# Patient Record
Sex: FEMALE | Race: WHITE | NOT HISPANIC OR LATINO | URBAN - METROPOLITAN AREA
[De-identification: names, ages, dates, MRNs, and addresses within clinical notes are randomized per-mention and may not be internally consistent; named-entity substitution may affect disease eponyms.]

---

## 2017-10-31 ENCOUNTER — EMERGENCY (EMERGENCY)
Facility: HOSPITAL | Age: 37
LOS: 0 days | Discharge: HOME | End: 2017-10-31

## 2017-10-31 DIAGNOSIS — R09.82 POSTNASAL DRIP: ICD-10-CM

## 2017-10-31 DIAGNOSIS — R05 COUGH: ICD-10-CM

## 2017-10-31 DIAGNOSIS — Z79.51 LONG TERM (CURRENT) USE OF INHALED STEROIDS: ICD-10-CM

## 2017-10-31 DIAGNOSIS — Z79.899 OTHER LONG TERM (CURRENT) DRUG THERAPY: ICD-10-CM

## 2017-10-31 DIAGNOSIS — Z91.048 OTHER NONMEDICINAL SUBSTANCE ALLERGY STATUS: ICD-10-CM

## 2017-10-31 DIAGNOSIS — R56.9 UNSPECIFIED CONVULSIONS: ICD-10-CM

## 2017-10-31 DIAGNOSIS — B34.9 VIRAL INFECTION, UNSPECIFIED: ICD-10-CM

## 2019-02-07 ENCOUNTER — OUTPATIENT (OUTPATIENT)
Dept: OUTPATIENT SERVICES | Facility: HOSPITAL | Age: 39
LOS: 1 days | Discharge: HOME | End: 2019-02-07

## 2019-02-07 DIAGNOSIS — E03.9 HYPOTHYROIDISM, UNSPECIFIED: ICD-10-CM

## 2019-12-08 ENCOUNTER — EMERGENCY (EMERGENCY)
Facility: HOSPITAL | Age: 39
LOS: 0 days | Discharge: HOME | End: 2019-12-08
Admitting: EMERGENCY MEDICINE
Payer: MEDICAID

## 2019-12-08 VITALS
TEMPERATURE: 97 F | SYSTOLIC BLOOD PRESSURE: 120 MMHG | DIASTOLIC BLOOD PRESSURE: 76 MMHG | WEIGHT: 179.9 LBS | OXYGEN SATURATION: 97 % | RESPIRATION RATE: 16 BRPM | HEART RATE: 76 BPM

## 2019-12-08 DIAGNOSIS — R05 COUGH: ICD-10-CM

## 2019-12-08 PROCEDURE — 71046 X-RAY EXAM CHEST 2 VIEWS: CPT | Mod: 26

## 2019-12-08 PROCEDURE — 99283 EMERGENCY DEPT VISIT LOW MDM: CPT

## 2019-12-08 RX ADMIN — Medication 100 MILLIGRAM(S): at 22:09

## 2019-12-08 NOTE — ED PROVIDER NOTE - PROGRESS NOTE DETAILS
Pt aware that we will have official radiology read pending, and may result in change of xray read.  Pt voices understanding of use of medications, instructions for care, and reasons to return or to go to ED  Pt counseled - warning si/sxs d/w pt. Pt instructed to return to ed or f/u with PCP should sxs persist/worsen. Pt verbalizes an understanding & agreement with the plan as discussed This was a difficult patient encounter. The patient's expectations as to management were not able to be met given the guidelines by the state, hospital guidelines, and the patient's personal care guidelines. I discussed at length with the patient my concerns and offered treatment options that were in keeping with our policies and procedures. Unfortunately, this did not alleviate the patient's concerns. At all times myself and staff were respectful of the patient, attempted to solve their issues within our constraints, and treated the patient with due courtesy. Unfortunately, the patient left disappointed. I encouraged them to follow up with their PCP for further treawtment and to return to the ED if they had any other symptoms or concerns.  pt and father came up to desk multiple times demanding xray be done, do not want to wait at d/c pts father now asking for dose of cough med here, understands will take a while to get from pharmacy. at d/c pts father now asking for dose of cough med here, understands will take a while to get from pharmacy. ***of note, pt has been in ED for over 1.5 hrs and not observed/heard coughing once

## 2019-12-08 NOTE — ED PROVIDER NOTE - OBJECTIVE STATEMENT
pt with dry cough for 2 weeks. went to PMD after 3 days and given amox x1 week without improvement. taking otc cough meds without improvement. pts father brought her in for eval because her coughing is keeping him up at night. nonsmoker, no ocp use, no recent travel or sick contacts. Denies fever/chill/HA/dizziness/chest pain/palpitation/sob/abd pain/n/v/d/ black stool/bloody stool/urinary sxs

## 2019-12-08 NOTE — ED ADULT NURSE NOTE - OBJECTIVE STATEMENT
Pt report unproductive cough and unable to sleep at night due to cough. Denies nausea, vomiting, fevers, and diarrhea.

## 2019-12-08 NOTE — ED PROVIDER NOTE - NS ED ROS FT
Constitutional: no fever, chills, no recent weight loss, change in appetite or malaise  Eyes: no redness/discharge/pain/vision changes  ENT: no rhinorrhea/ear pain/sore throat  Cardiac: No chest pain, SOB or edema.  Respiratory: No respiratory distress  GI: No nausea, vomiting, diarrhea or abdominal pain.  : No dysuria, frequency, urgency or hematuria  MS: no pain to back or extremities, no loss of ROM, no weakness  Neuro: No headache or weakness. No LOC.  Skin: No skin rash.  Except as documented in the HPI, all other systems are negative.

## 2019-12-08 NOTE — ED PROVIDER NOTE - PHYSICAL EXAMINATION
CONSTITUTIONAL: Well-appearing; well-nourished; in no apparent distress.   ENT: normal nose; no rhinorrhea; normal pharynx with no tonsillar hypertrophy.   NECK: Supple; non-tender; no cervical lymphadenopathy. No JVD.   CARDIOVASCULAR: Normal S1, S2; no murmurs, rubs, or gallops.   RESPIRATORY: Normal chest excursion with respiration; breath sounds clear and equal bilaterally; no wheezes, rhonchi, or rales.  SKIN: Normal for age and race; warm; dry; good turgor; no apparent lesions or exudate.   NEURO/PSYCH: A & O x 4; grossly unremarkable. mood and manner are appropriate. Grooming and personal hygiene are appropriate. No apparent thoughts of harm to self or others.

## 2020-07-02 PROBLEM — M41.9 SCOLIOSIS, UNSPECIFIED: Chronic | Status: ACTIVE | Noted: 2019-12-08

## 2020-07-06 PROBLEM — Z00.00 ENCOUNTER FOR PREVENTIVE HEALTH EXAMINATION: Status: ACTIVE | Noted: 2020-07-06

## 2020-07-07 ENCOUNTER — APPOINTMENT (OUTPATIENT)
Dept: SURGERY | Facility: CLINIC | Age: 40
End: 2020-07-07

## 2021-06-05 ENCOUNTER — EMERGENCY (EMERGENCY)
Facility: HOSPITAL | Age: 41
LOS: 0 days | Discharge: HOME | End: 2021-06-05
Attending: EMERGENCY MEDICINE | Admitting: EMERGENCY MEDICINE
Payer: MEDICAID

## 2021-06-05 VITALS
SYSTOLIC BLOOD PRESSURE: 123 MMHG | DIASTOLIC BLOOD PRESSURE: 60 MMHG | HEART RATE: 86 BPM | TEMPERATURE: 98 F | RESPIRATION RATE: 18 BRPM | OXYGEN SATURATION: 98 %

## 2021-06-05 VITALS
SYSTOLIC BLOOD PRESSURE: 120 MMHG | HEART RATE: 106 BPM | HEIGHT: 60 IN | OXYGEN SATURATION: 95 % | DIASTOLIC BLOOD PRESSURE: 67 MMHG | RESPIRATION RATE: 18 BRPM | WEIGHT: 190.04 LBS | TEMPERATURE: 98 F

## 2021-06-05 DIAGNOSIS — Z79.899 OTHER LONG TERM (CURRENT) DRUG THERAPY: ICD-10-CM

## 2021-06-05 DIAGNOSIS — F41.9 ANXIETY DISORDER, UNSPECIFIED: ICD-10-CM

## 2021-06-05 DIAGNOSIS — Z86.69 PERSONAL HISTORY OF OTHER DISEASES OF THE NERVOUS SYSTEM AND SENSE ORGANS: ICD-10-CM

## 2021-06-05 DIAGNOSIS — K03.81 CRACKED TOOTH: ICD-10-CM

## 2021-06-05 DIAGNOSIS — K08.89 OTHER SPECIFIED DISORDERS OF TEETH AND SUPPORTING STRUCTURES: ICD-10-CM

## 2021-06-05 DIAGNOSIS — K02.9 DENTAL CARIES, UNSPECIFIED: ICD-10-CM

## 2021-06-05 DIAGNOSIS — Z87.39 PERSONAL HISTORY OF OTHER DISEASES OF THE MUSCULOSKELETAL SYSTEM AND CONNECTIVE TISSUE: ICD-10-CM

## 2021-06-05 DIAGNOSIS — J45.909 UNSPECIFIED ASTHMA, UNCOMPLICATED: ICD-10-CM

## 2021-06-05 PROCEDURE — 99283 EMERGENCY DEPT VISIT LOW MDM: CPT

## 2021-06-05 RX ORDER — ACETAMINOPHEN 500 MG
975 TABLET ORAL ONCE
Refills: 0 | Status: COMPLETED | OUTPATIENT
Start: 2021-06-05 | End: 2021-06-05

## 2021-06-05 RX ORDER — IBUPROFEN 200 MG
600 TABLET ORAL ONCE
Refills: 0 | Status: COMPLETED | OUTPATIENT
Start: 2021-06-05 | End: 2021-06-05

## 2021-06-05 RX ADMIN — Medication 975 MILLIGRAM(S): at 19:30

## 2021-06-05 RX ADMIN — Medication 600 MILLIGRAM(S): at 19:30

## 2021-06-05 NOTE — ED PROVIDER NOTE - NS ED ROS FT
CONST: No fever, chills or bodyaches  EYES: No pain, redness, drainage or visual changes.  ENT: No ear pain or discharge, nasal discharge or congestion. No sore throat. (+) right lower tooth pain  CARD: No chest pain, palpitations  RESP: No SOB, cough, hemoptysis. No hx of asthma or COPD  GI: No abdominal pain, N/V/D  : No urinary symptoms  MS: No joint pain, back pain or extremity pain/injury  SKIN: No rashes  NEURO: No headache, dizziness, paresthesias or LOC

## 2021-06-05 NOTE — ED PROVIDER NOTE - NSFOLLOWUPINSTRUCTIONS_ED_ALL_ED_FT
Toothache    WHAT YOU NEED TO KNOW:    A toothache is pain that is caused by irritation of the nerves in the center of your tooth. The irritation may be caused by several problems, such as a cavity, an infection, a cracked tooth, or gum disease. Tooth Anatomy         DISCHARGE INSTRUCTIONS:    Return to the emergency department if:     You have trouble breathing or swallowing.       You have swelling in your face or neck.     Contact your dentist if:     You have a fever and chills.       You have trouble opening or closing your mouth.       You have swelling around your tooth.       You have questions or concerns about your condition or care.    Medicines: You may need any of the following:     NSAIDs, such as ibuprofen, help decrease swelling, pain, and fever. This medicine is available with or without a doctor's order. NSAIDs can cause stomach bleeding or kidney problems in certain people. If you take blood thinner medicine, always ask if NSAIDs are safe for you. Always read the medicine label and follow directions. Do not give these medicines to children under 6 months of age without direction from your child's healthcare provider.      Acetaminophen decreases pain and fever. It is available without a doctor's order. Ask how much to take and how often to take it. Follow directions. Acetaminophen can cause liver damage if not taken correctly.      Prescription pain medicine may be given. Ask your healthcare provider how to take this medicine safely. Some prescription pain medicines contain acetaminophen. Do not take other medicines that contain acetaminophen without talking to your healthcare provider. Too much acetaminophen may cause liver damage. Prescription pain medicine may cause constipation. Ask your healthcare provider how to prevent or treat constipation.       Antibiotics help treat or prevent a bacterial infection.       Take your medicine as directed. Contact your healthcare provider if you think your medicine is not helping or if you have side effects. Tell him of her if you are allergic to any medicine. Keep a list of the medicines, vitamins, and herbs you take. Include the amounts, and when and why you take them. Bring the list or the pill bottles to follow-up visits. Carry your medicine list with you in case of an emergency.    Self-care:     Rinse your mouth with warm salt water 4 times a day or as directed.       Eat soft foods to help relieve pain caused by chewing.       Apply ice on your jaw or cheek for 15 to 20 minutes every hour or as directed. Use an ice pack, or put crushed ice in a plastic bag. Cover it with a towel before you apply it. Ice helps prevent tissue damage and decreases swelling and pain.    Help prevent a toothache:     Brush your teeth at least 2 times a day.      Use dental floss to clean between your teeth at least 1 time a day.      See your dentist regularly every 6 months for dental cleanings and oral exams.    Follow up with your dentist as directed: You may be referred to a dental surgeon. Write down your questions so you remember to ask them during your visits.        © Copyright [employer] 2019 All illustrations and images included in CareNotes are the copyrighted property of [employer] or [employer] network [employer].   Tooth Avulsion  Tooth avulsion is the loss of a tooth due to the tooth falling out or being knocked out of its place in the gum (socket). This condition must be treated at a dental clinic or an emergency department right away.    Your health care provider will evaluate whether the tooth can be placed back into its original position (replanted) if it is an adult tooth. (Baby teeth do not usually need replanting.) Your treatment will also include controlling any bleeding or pain.    The sooner the tooth is replanted, the better the chance that it can be saved. It is usually best if the tooth is replanted within one hour of avulsion. However, even if it has been longer than one hour, it is still important to visit your dentist as soon as possible to discuss your treatment options.    Follow these instructions at home:  Before going to the dentist or emergency department:    Find the tooth if it is outside of the mouth. Do not touch the bottom (roots) of the tooth.  Wash the tooth for 10 seconds under cold running water or bottled water. Do not scrub the tooth.  Gently reposition the tooth in its original socket. If the tooth cannot be repositioned, immediately place the tooth in a glass of cold milk or hold the tooth inside your mouth under your tongue or between your molars and your cheek.    After coming home from the dentist or emergency department:    Take over-the-counter and prescription medicines only as told by your health care provider.  Avoid activities that have a high risk of dental injury, such as contact sports, for two weeks or as directed by your health care provider. Wear a mouth guard while playing contact sports.  Brush the tooth with a soft toothbrush after every meal.  Eat a soft diet for two weeks or as directed by your health care provider.  Use a chlorhexidine mouth rinse twice a day or as directed by your health care provider.  Keep all follow-up visits as told by your health care provider. This is important.    Contact a health care provider if:  There is blood, fluid, or pus coming from the tooth socket.  You have pain that does not get better with medicine.  You develop a fever.  Summary  Tooth avulsion is the loss of a tooth due to the tooth falling out or being knocked out.  If the tooth was an adult tooth, your health care provider will see if it can be placed back into its original position (replanted). Baby teeth will not usually need to be replanted.  The sooner the tooth is replanted, the better the chance that it can be saved.  This information is not intended to replace advice given to you by your health care provider. Make sure you discuss any questions you have with your health care provider.

## 2021-06-05 NOTE — ED PROVIDER NOTE - NSFOLLOWUPCLINICS_GEN_ALL_ED_FT
[facility]  Dental  [address] [place] [id number]  Phone: [phone]  Fax:   Follow Up Time: 1-3 Days

## 2021-06-05 NOTE — ED PROVIDER NOTE - OBJECTIVE STATEMENT
39 y/o female with a PMH of epilepsy on keppra, asthma, and anxiety presents to the ED for evaluation of right lower tooth pain that began earlier today after eating dinner. pt reports she feels her right lower tooth cracked, but does not believes she swallowed the tooth. pt denies fever, chills, cough, sore throat, recent trauma, drooling, or facial swelling.

## 2021-06-05 NOTE — ED PROVIDER NOTE - CLINICAL SUMMARY MEDICAL DECISION MAKING FREE TEXT BOX
Pt presented to ED for dental pain after fractured tooth. Abx and pain control given. Will dc with dental f/up. Results reviewed and discussed with pt and printed for patient. Anticipatory guidance given including close outpatient followup. Strict return precautions given. Pt verbalizes understanding of and agrees with plan.

## 2021-06-05 NOTE — ED PROVIDER NOTE - ATTENDING CONTRIBUTION TO CARE
I personally evaluated the patient. I reviewed the Resident’s or Physician Assistant’s note (as assigned above), and agree with the findings and plan except as documented in my note.    Pt is a 39 y/o female with hx of seizures presents to ED for dental pain to right lower teeth after eating something and felt tooth crack. No fever, swelling. No other complaints.     Constitutional: Well developed, well nourished. NAD.  Head: Normocephalic.  Eyes: EOMI.  Dental: Diffuse caries and poor dentition. right lower molar with fracture.  Cardiovascular: Normal S1, S2. Regular rate and rhythm. No murmurs, rubs, or gallops.  Pulmonary: Normal respiratory rate and effort. Lungs clear to auscultation bilaterally. No wheezing, rales, or rhonchi.  Neuro: No focal neurological deficits.

## 2021-06-05 NOTE — ED PROVIDER NOTE - PATIENT PORTAL LINK FT
You can access the FollowMyHealth Patient Portal offered by [facility] by registering at the following website: [email or web address]. By joining [facility]’s FollowMyHealth portal, you will also be able to view your health information using other applications (apps) compatible with our system.

## 2021-06-05 NOTE — ED PROVIDER NOTE - PHYSICAL EXAMINATION
Physical Exam    Vital Signs: I have reviewed the initial vital signs.  Constitutional: well-nourished, appears stated age, no acute distress  Eyes: Conjunctiva pink, Sclera clear  ENT: Oropharynx is clear without lesions. uvula midline. no tonsillar erythema, edema, or exudates. no stridor. no pta. floor of the mouth is soft without pus. (+) poor dentition with multiple cavities right lower tooth #31 is avulsed and decaying. no visible dental abscess.   Cardiovascular: S1 and S2, regular rate, regular rhythm, well-perfused extremities, radial pulses equal and 2+ b/l.   Respiratory: unlabored respiratory effort, clear to auscultation bilaterally no wheezing, rales and rhonchi. pt is speaking full sentences. no accessory muscle use.   Musculoskeletal: supple neck, FROM of b/l upper and lower extremities.   Integumentary: warm, dry, no rash  Neurologic: awake, alert  Psychiatric: appropriate mood, appropriate affect

## 2021-08-16 NOTE — ED ADULT NURSE NOTE - PMH
Scoliosis Tranexamic Acid Counseling:  Patient advised of the small risk of bleeding problems with tranexamic acid. They were also instructed to call if they developed any nausea, vomiting or diarrhea. All of the patient's questions and concerns were addressed.

## 2021-10-08 NOTE — ED ADULT NURSE NOTE - BREATHING, MLM
Spontaneous, unlabored and symmetrical Pt states 4 weeks pregnant and having 5 days of RLQ abd pain and it feels "burning". Pt denies urinary symptoms, fever/chills, N/V/D.

## 2022-10-11 ENCOUNTER — NON-APPOINTMENT (OUTPATIENT)
Age: 42
End: 2022-10-11

## 2023-04-07 ENCOUNTER — APPOINTMENT (OUTPATIENT)
Dept: NEUROLOGY | Facility: CLINIC | Age: 43
End: 2023-04-07

## 2023-04-26 ENCOUNTER — APPOINTMENT (OUTPATIENT)
Dept: NEUROLOGY | Facility: CLINIC | Age: 43
End: 2023-04-26
Payer: MEDICAID

## 2023-04-26 VITALS
DIASTOLIC BLOOD PRESSURE: 86 MMHG | SYSTOLIC BLOOD PRESSURE: 118 MMHG | WEIGHT: 230 LBS | BODY MASS INDEX: 36.1 KG/M2 | HEIGHT: 67 IN | HEART RATE: 85 BPM

## 2023-04-26 DIAGNOSIS — F41.9 ANXIETY DISORDER, UNSPECIFIED: ICD-10-CM

## 2023-04-26 DIAGNOSIS — R56.9 UNSPECIFIED CONVULSIONS: ICD-10-CM

## 2023-04-26 PROCEDURE — 99204 OFFICE O/P NEW MOD 45 MIN: CPT

## 2023-04-26 RX ORDER — OLANZAPINE 10 MG/1
10 TABLET, FILM COATED ORAL DAILY
Qty: 90 | Refills: 0 | Status: ACTIVE | COMMUNITY

## 2023-04-26 RX ORDER — LEVETIRACETAM 750 MG/1
750 TABLET, FILM COATED ORAL
Qty: 180 | Refills: 3 | Status: ACTIVE | COMMUNITY
Start: 1900-01-01 | End: 1900-01-01

## 2023-04-26 RX ORDER — ESCITALOPRAM OXALATE 10 MG/1
10 TABLET ORAL DAILY
Qty: 90 | Refills: 0 | Status: ACTIVE | COMMUNITY
Start: 1900-01-01 | End: 1900-01-01

## 2023-04-26 NOTE — ASSESSMENT
[FreeTextEntry1] : Seizures.\par \par - Patient will continue to take Keppra 750mg BID.\par -will get past workup, if those minor episodes were not ruled out as seizures, she would need additional 24/48h eeg\par \par \par anxiety\par -continue with current meds for now\par -will need psych eval and management\par \par \par - I Will Follow up with her in Two Months. \par \par \par \par \par \par I, Aishwarya Higginbotham, Attest that this documentation has been prepared under the direction and in the presence of Provider Noel Bang DO\par \par Thank You for letting me assist in the management of this patient. \par \par Noel Bang DO\par Board Certified, Neurology\par

## 2023-04-26 NOTE — HISTORY OF PRESENT ILLNESS
[FreeTextEntry1] : It's a pleasure to see Ms. CARLOS A HOFFMAN In the office today. She is a 42 year -  old woman  who presents to the office today because her neurologist has retired.  she has seen Dr. Solorzano for the last 15 years for epilepsy as well as anxiety/depression.  Her last GTC was said to be in [address] although her father reports minor seizures such as sudden outburst, sleep walking and etc.  it's unclear if these episodes were thought to be seizures by her prior neurologist since her seizure medication has not been changed for a long time.  no new complaints at this time.

## 2023-05-31 ENCOUNTER — APPOINTMENT (OUTPATIENT)
Dept: NEUROLOGY | Facility: CLINIC | Age: 43
End: 2023-05-31

## 2024-01-08 ENCOUNTER — EMERGENCY (EMERGENCY)
Facility: HOSPITAL | Age: 44
LOS: 0 days | Discharge: HOME | End: 2024-01-08
Attending: EMERGENCY MEDICINE
Payer: MEDICAID

## 2024-01-08 VITALS — HEART RATE: 91 BPM

## 2024-01-08 VITALS
HEART RATE: 105 BPM | OXYGEN SATURATION: 97 % | SYSTOLIC BLOOD PRESSURE: 115 MMHG | TEMPERATURE: 98 F | RESPIRATION RATE: 18 BRPM | WEIGHT: 199.96 LBS | DIASTOLIC BLOOD PRESSURE: 54 MMHG

## 2024-01-08 DIAGNOSIS — Z86.19 PERSONAL HISTORY OF OTHER INFECTIOUS AND PARASITIC DISEASES: ICD-10-CM

## 2024-01-08 DIAGNOSIS — R05.8 OTHER SPECIFIED COUGH: ICD-10-CM

## 2024-01-08 DIAGNOSIS — Z20.822 CONTACT WITH AND (SUSPECTED) EXPOSURE TO COVID-19: ICD-10-CM

## 2024-01-08 DIAGNOSIS — J45.909 UNSPECIFIED ASTHMA, UNCOMPLICATED: ICD-10-CM

## 2024-01-08 DIAGNOSIS — G40.909 EPILEPSY, UNSPECIFIED, NOT INTRACTABLE, WITHOUT STATUS EPILEPTICUS: ICD-10-CM

## 2024-01-08 LAB
FLUAV AG NPH QL: SIGNIFICANT CHANGE UP
FLUAV AG NPH QL: SIGNIFICANT CHANGE UP
FLUBV AG NPH QL: SIGNIFICANT CHANGE UP
FLUBV AG NPH QL: SIGNIFICANT CHANGE UP
RSV RNA NPH QL NAA+NON-PROBE: SIGNIFICANT CHANGE UP
RSV RNA NPH QL NAA+NON-PROBE: SIGNIFICANT CHANGE UP
SARS-COV-2 RNA SPEC QL NAA+PROBE: SIGNIFICANT CHANGE UP
SARS-COV-2 RNA SPEC QL NAA+PROBE: SIGNIFICANT CHANGE UP

## 2024-01-08 PROCEDURE — 71046 X-RAY EXAM CHEST 2 VIEWS: CPT | Mod: 26

## 2024-01-08 PROCEDURE — 94640 AIRWAY INHALATION TREATMENT: CPT

## 2024-01-08 PROCEDURE — 99284 EMERGENCY DEPT VISIT MOD MDM: CPT

## 2024-01-08 PROCEDURE — 71046 X-RAY EXAM CHEST 2 VIEWS: CPT

## 2024-01-08 PROCEDURE — 0241U: CPT

## 2024-01-08 PROCEDURE — 99283 EMERGENCY DEPT VISIT LOW MDM: CPT | Mod: 25

## 2024-01-08 RX ORDER — ALBUTEROL 90 UG/1
1 AEROSOL, METERED ORAL ONCE
Refills: 0 | Status: COMPLETED | OUTPATIENT
Start: 2024-01-08 | End: 2024-01-08

## 2024-01-08 RX ORDER — DEXAMETHASONE 0.5 MG/5ML
10 ELIXIR ORAL ONCE
Refills: 0 | Status: COMPLETED | OUTPATIENT
Start: 2024-01-08 | End: 2024-01-08

## 2024-01-08 RX ADMIN — ALBUTEROL 1 PUFF(S): 90 AEROSOL, METERED ORAL at 13:17

## 2024-01-08 RX ADMIN — Medication 10 MILLIGRAM(S): at 13:17

## 2024-01-08 NOTE — ED PROVIDER NOTE - CLINICAL SUMMARY MEDICAL DECISION MAKING FREE TEXT BOX
43-year-old female, past medical history of epilepsy on Keppra, asthma, anxiety, comes in complaining of 1 month history of cough.  Patient states she was sick a month ago with URI, most symptoms resolved except cough.  No fever/chills, chest pain/shortness of breath, abdominal pain, nausea/vomiting/diarrhea.  Patient went to urgent care, was given Tessalon Perles with minimal relief.  On exam, pt in NAD, AAOx3, head NC/AT, CN II-XII intact, lungs CTA B/L, CV S1S2 regular, abdomen soft/NT/ND/(+)BS, ext (-) edema, motor 5/5x4, sensation intact.  Chest x-ray done and reviewed–no opacities or infiltrates noted.  Decadron/albuterol given.  Possibly residual cough. Will DC with pulm follow-up. Pt did not cough in the ED.

## 2024-01-08 NOTE — ED ADULT NURSE NOTE - CHIEF COMPLAINT QUOTE
cough x 1 month, went to [facility], was prescribed cough meds, no relief in symptoms, so she came in for eval. cough x 1 month, went to [facility], was prescribed cough meds, no relief in symptoms, so she came in for eval.

## 2024-01-08 NOTE — ED ADULT TRIAGE NOTE - SPO2 (%)
97 Bactrim Pregnancy And Lactation Text: This medication is Pregnancy Category D and is known to cause fetal risk.  It is also excreted in breast milk.

## 2024-01-08 NOTE — ED PROVIDER NOTE - OBJECTIVE STATEMENT
43-year-old female with past medical history of seizure who presents to the ED with a cough.  Reports that cough started 1 month ago and symptoms is not improving, so decided come to the ED for evaluation.  Reports that she went to urgent care about a week ago and was given amoxicillin along with benzonatate; however, symptoms did not improve.  Denies fever, shortness of breath, chest pain, nausea, vomiting, abdominal pain, and pain or discomfort elsewhere.

## 2024-01-08 NOTE — ED ADULT NURSE NOTE - CHPI ED NUR SYMPTOMS POS
Pt presents to Ed with c/o cough x1 month. Pt was seen in UC and prescribed cough meds, denies relief from medication./COUGH

## 2024-01-08 NOTE — ED ADULT NURSE NOTE - NSFALLUNIVINTERV_ED_ALL_ED
Bed/Stretcher in lowest position, wheels locked, appropriate side rails in place/Call bell, personal items and telephone in reach/Instruct patient to call for assistance before getting out of bed/chair/stretcher/Non-slip footwear applied when patient is off stretcher/[place] to call system/Physically safe environment - no spills, clutter or unnecessary equipment/Purposeful proactive rounding/Room/bathroom lighting operational, light cord in reach Bed/Stretcher in lowest position, wheels locked, appropriate side rails in place/Call bell, personal items and telephone in reach/Instruct patient to call for assistance before getting out of bed/chair/stretcher/Non-slip footwear applied when patient is off stretcher/[place] to call system/Physically safe environment - no spills, clutter or unnecessary equipment/Purposeful proactive rounding/Room/bathroom lighting operational, light cord in reach

## 2024-01-08 NOTE — ED ADULT TRIAGE NOTE - CHIEF COMPLAINT QUOTE
cough x 1 month, [place] [facility] meds, no relief in symptoms, so she came in for eval. cough x 1 month, went to [facility], was prescribed cough meds, no relief in symptoms, so she came in for eval.

## 2024-01-08 NOTE — ED PROVIDER NOTE - PHYSICAL EXAMINATION
CONSTITUTIONAL: Well-appearing; in no apparent distress.   HEAD: Normocephalic; atraumatic.   EYES: Pupils are round and reactive, extra-ocular muscles are intact. Eyelids are normal in appearance without swelling or lesions.   ENT: Hearing is intact with good acuity to spoken voice. Oral mucosa is pink and moist. The pharynx is normal in appearance without tonsillar swelling or exudates.  Patient is speaking clearly, not muffled and airway is intact.  RESPIRATORY: No signs of respiratory distress. Lung sounds are clear in all lobes bilaterally without rales, rhonchi, or wheezes.  CARDIOVASCULAR: Regular rate and rhythm.   GI: Abdomen is soft, non-tender, and without distention. Bowel sounds are present and normoactive in all four quadrants. No masses are noted.   NEURO: A & O x 3. Normal speech. No focal deficit.  PSYCHOLOGICAL: Appropriate mood and affect. Good judgement and insight.

## 2024-01-23 ENCOUNTER — APPOINTMENT (OUTPATIENT)
Dept: PULMONOLOGY | Facility: CLINIC | Age: 44
End: 2024-01-23
Payer: MEDICAID

## 2024-01-23 VITALS
OXYGEN SATURATION: 96 % | WEIGHT: 200 LBS | SYSTOLIC BLOOD PRESSURE: 125 MMHG | BODY MASS INDEX: 31.32 KG/M2 | DIASTOLIC BLOOD PRESSURE: 60 MMHG | HEART RATE: 83 BPM

## 2024-01-23 DIAGNOSIS — R05.9 COUGH, UNSPECIFIED: ICD-10-CM

## 2024-01-23 PROCEDURE — 99203 OFFICE O/P NEW LOW 30 MIN: CPT

## 2024-01-23 NOTE — ASSESSMENT
[FreeTextEntry1] : Assessment: Stable asthma: Chronic cough due to above  plan: Stress compliance with inhalers. Renewed today. cont PRN albuterol cont ICS/LABA to be taken as a daily medication. I reviewed the entire case with the patient's father who was in attendance today.  F/U 6 months

## 2024-01-23 NOTE — REASON FOR VISIT
[Initial] : an initial visit [TextBox_44] : Patient carries a diagnosis of intermittent asthma.  She was recently seen in the emergency room several times for chronic nonproductive cough.  It seems to start up as soon as the cold weather began.  She was placed on ICS/LABA by her primary which she takes intermittently.  She feels that when she takes the medication the coughing disappears and she feels much better.  Unfortunately she has just been using it here there.  She had a recent x-ray at the hospital which failed to reveal any abnormalities.  The ER attending noted that while she was in the emergency room there was no cough at all.  Likewise in the office today there is no cough.  The family has forced hot air heat.  The docs have not been blowing out for some time.

## 2024-04-15 ENCOUNTER — APPOINTMENT (OUTPATIENT)
Dept: PULMONOLOGY | Facility: CLINIC | Age: 44
End: 2024-04-15
Payer: MEDICAID

## 2024-04-15 VITALS
BODY MASS INDEX: 31.32 KG/M2 | WEIGHT: 200 LBS | DIASTOLIC BLOOD PRESSURE: 66 MMHG | OXYGEN SATURATION: 99 % | HEART RATE: 85 BPM | SYSTOLIC BLOOD PRESSURE: 120 MMHG

## 2024-04-15 DIAGNOSIS — J45.909 UNSPECIFIED ASTHMA, UNCOMPLICATED: ICD-10-CM

## 2024-04-15 PROCEDURE — G2211 COMPLEX E/M VISIT ADD ON: CPT | Mod: NC,1L

## 2024-04-15 PROCEDURE — 99213 OFFICE O/P EST LOW 20 MIN: CPT

## 2024-04-15 RX ORDER — ALBUTEROL SULFATE 90 UG/1
108 (90 BASE) INHALANT RESPIRATORY (INHALATION)
Qty: 1 | Refills: 3 | Status: ACTIVE | COMMUNITY
Start: 2024-01-23 | End: 1900-01-01

## 2024-04-15 NOTE — REASON FOR VISIT
[Follow-Up] : a follow-up visit [Asthma] : asthma [Obesity] : obesity [TextBox_44] : Doing very well feeling much better on the ICS/LABA

## 2024-04-16 RX ORDER — FLUTICASONE PROPIONATE AND SALMETEROL 100; 50 UG/1; UG/1
100-50 POWDER RESPIRATORY (INHALATION)
Qty: 1 | Refills: 5 | Status: ACTIVE | COMMUNITY
Start: 2024-01-23 | End: 1900-01-01

## 2024-09-17 ENCOUNTER — APPOINTMENT (OUTPATIENT)
Dept: PULMONOLOGY | Facility: CLINIC | Age: 44
End: 2024-09-17
Payer: MEDICAID

## 2024-09-17 VITALS
DIASTOLIC BLOOD PRESSURE: 64 MMHG | OXYGEN SATURATION: 99 % | HEART RATE: 82 BPM | BODY MASS INDEX: 31.39 KG/M2 | WEIGHT: 200 LBS | HEIGHT: 67 IN | SYSTOLIC BLOOD PRESSURE: 120 MMHG

## 2024-09-17 DIAGNOSIS — J45.909 UNSPECIFIED ASTHMA, UNCOMPLICATED: ICD-10-CM

## 2024-09-17 PROCEDURE — 99213 OFFICE O/P EST LOW 20 MIN: CPT

## 2024-09-17 PROCEDURE — G2211 COMPLEX E/M VISIT ADD ON: CPT | Mod: NC

## 2024-09-17 RX ORDER — FLUTICASONE PROPIONATE AND SALMETEROL 250; 50 UG/1; UG/1
250-50 POWDER RESPIRATORY (INHALATION)
Qty: 180 | Refills: 3 | Status: ACTIVE | COMMUNITY
Start: 2024-09-17 | End: 1900-01-01

## 2024-09-17 NOTE — ASSESSMENT
[FreeTextEntry1] : Plan: - Summary : Plan to switch the patient from current Advair dosage to a higher concentration (generic brand) to better manage symptoms. - Plan : - Increase the concentration of the patient's Advair medication.  - Schedule a follow-up appointment in the spring to monitor progress of symptoms.  - Instruct patient to return sooner if there are issues before the scheduled appointment in spring.

## 2024-09-17 NOTE — HISTORY OF PRESENT ILLNESS
[TextBox_4] :  Subjective: Patient states she experiences wheezing two times a week. The patient's work involves physical activity which sometimes leads to shortness of breath. The patient is currently taking Advair (low-dose). Family History: Past Medical History: The patient has a history of breathing issues and currently seems to be suffering from asthma. The patient is already on medication (Advair). Impression: The patient, who has a history of asthma, reports instances of wheezing about two times a week. This condition is possibly exacerbated by physical activity during work times. Current medication plan includes the usage of Advair, which does not seem to be completely controlling the symptoms. Considering increasing the concentration of Advair.

## 2024-12-12 NOTE — ED ADULT NURSE NOTE - CAS DISCH ACCOMP BY
Test explained, patient verbalized understanding. Stress test completed per protocol without any complications.   
Parent(s)

## 2025-03-03 ENCOUNTER — RX RENEWAL (OUTPATIENT)
Age: 45
End: 2025-03-03

## 2025-05-23 ENCOUNTER — RX RENEWAL (OUTPATIENT)
Age: 45
End: 2025-05-23

## 2025-05-23 RX ORDER — FLUTICASONE PROPIONATE AND SALMETEROL 100; 50 UG/1; UG/1
100-50 POWDER RESPIRATORY (INHALATION)
Qty: 3 | Refills: 3 | Status: ACTIVE | COMMUNITY
Start: 2025-05-23 | End: 1900-01-01

## 2025-07-23 ENCOUNTER — APPOINTMENT (OUTPATIENT)
Dept: PULMONOLOGY | Facility: CLINIC | Age: 45
End: 2025-07-23
Payer: MEDICAID

## 2025-07-23 VITALS
SYSTOLIC BLOOD PRESSURE: 118 MMHG | BODY MASS INDEX: 31.32 KG/M2 | WEIGHT: 200 LBS | DIASTOLIC BLOOD PRESSURE: 70 MMHG | HEART RATE: 100 BPM | RESPIRATION RATE: 14 BRPM | OXYGEN SATURATION: 98 %

## 2025-07-23 DIAGNOSIS — J45.909 UNSPECIFIED ASTHMA, UNCOMPLICATED: ICD-10-CM

## 2025-07-23 PROCEDURE — 99213 OFFICE O/P EST LOW 20 MIN: CPT

## 2025-07-23 PROCEDURE — G2211 COMPLEX E/M VISIT ADD ON: CPT | Mod: NC
